# Patient Record
Sex: MALE | Race: WHITE
[De-identification: names, ages, dates, MRNs, and addresses within clinical notes are randomized per-mention and may not be internally consistent; named-entity substitution may affect disease eponyms.]

---

## 2018-02-06 ENCOUNTER — HOSPITAL ENCOUNTER (OUTPATIENT)
Dept: HOSPITAL 63 - CT | Age: 68
Discharge: HOME | End: 2018-02-06
Attending: PHYSICIAN ASSISTANT
Payer: MEDICARE

## 2018-02-06 DIAGNOSIS — Z86.73: ICD-10-CM

## 2018-02-06 DIAGNOSIS — H70.92: ICD-10-CM

## 2018-02-06 DIAGNOSIS — H53.8: Primary | ICD-10-CM

## 2018-02-06 PROCEDURE — 70450 CT HEAD/BRAIN W/O DYE: CPT

## 2018-02-06 NOTE — RAD
CT of the head without contrast, 2/6/2018:



History: Right eye visual disturbance, previous stroke



There is mild bilateral cerebral atrophy.  There are extensive patchy

lucencies in the deep white matter bilaterally compatible with chronic

ischemic change.  The ventricles are mildly prominent on a compensatory basis.

 There is no shift of the midline structures.  There is no evidence of acute

intracranial hemorrhage or mass effect.



There is fluid in some of the left mastoid air cells presumably on an

inflammatory basis.





IMPRESSION:

1.  Cerebral atrophy.

2.  Extensive bilateral deep white matter lucencies compatible with chronic

ischemic change.

3.  No acute intracranial abnormality is detected.

4.  Left mastoiditis















[employer]RS Compliance Statement:



One or more of the following individualized dose reduction techniques were

utilized for this examination:

1. Automated exposure control

2. Adjustment of the mA and/or kV according to patient size

3. Use of iterative reconstruction technique

## 2018-04-16 ENCOUNTER — HOSPITAL ENCOUNTER (EMERGENCY)
Dept: HOSPITAL 61 - ER | Age: 68
Discharge: HOME | End: 2018-04-16
Payer: MEDICARE

## 2018-04-16 DIAGNOSIS — K21.9: ICD-10-CM

## 2018-04-16 DIAGNOSIS — N28.9: ICD-10-CM

## 2018-04-16 DIAGNOSIS — Z79.4: ICD-10-CM

## 2018-04-16 DIAGNOSIS — E11.9: ICD-10-CM

## 2018-04-16 DIAGNOSIS — J44.9: ICD-10-CM

## 2018-04-16 DIAGNOSIS — F12.10: ICD-10-CM

## 2018-04-16 DIAGNOSIS — I50.9: ICD-10-CM

## 2018-04-16 DIAGNOSIS — E87.5: ICD-10-CM

## 2018-04-16 DIAGNOSIS — Z95.5: ICD-10-CM

## 2018-04-16 DIAGNOSIS — I25.2: ICD-10-CM

## 2018-04-16 DIAGNOSIS — E78.00: ICD-10-CM

## 2018-04-16 DIAGNOSIS — R42: Primary | ICD-10-CM

## 2018-04-16 DIAGNOSIS — I11.0: ICD-10-CM

## 2018-04-16 DIAGNOSIS — I25.10: ICD-10-CM

## 2018-04-16 DIAGNOSIS — Z86.73: ICD-10-CM

## 2018-04-16 LAB
ADD MAN DIFF?: NO
ALBUMIN SERPL-MCNC: 3.7 G/DL (ref 3.4–5)
ALBUMIN/GLOB SERPL: 1.1 {RATIO} (ref 1–1.7)
ALP SERPL-CCNC: 36 U/L (ref 46–116)
ALT (SGPT): 34 U/L (ref 16–63)
ANION GAP SERPL CALC-SCNC: 10 MMOL/L (ref 6–14)
AST SERPL-CCNC: 27 U/L (ref 15–37)
BASO #: 0.1 X10^3/UL (ref 0–0.2)
BASO %: 1 % (ref 0–3)
BLOOD UREA NITROGEN: 29 MG/DL (ref 8–26)
BUN/CREAT SERPL: 19 (ref 6–20)
CALCIUM: 9.3 MG/DL (ref 8.5–10.1)
CHLORIDE: 105 MMOL/L (ref 98–107)
CO2 SERPL-SCNC: 24 MMOL/L (ref 21–32)
CREAT SERPL-MCNC: 1.5 MG/DL (ref 0.7–1.3)
EOS #: 0.3 X10^3/UL (ref 0–0.7)
EOS %: 3 % (ref 0–3)
GFR SERPLBLD BASED ON 1.73 SQ M-ARVRAT: 46.8 ML/MIN
GLOBULIN SER-MCNC: 3.3 G/DL (ref 2.2–3.8)
GLUCOSE SERPL-MCNC: 126 MG/DL (ref 70–99)
HCG SERPL-ACNC: 10.5 X10^3/UL (ref 4–11)
HEMATOCRIT: 39.5 % (ref 39–53)
HEMOGLOBIN: 13 G/DL (ref 13–17.5)
INR: 1 (ref 0.8–1.1)
LYMPH #: 2.5 X10^3/UL (ref 1–4.8)
LYMPH %: 24 % (ref 24–48)
MEAN CORPUSCULAR HEMOGLOBIN: 30 PG (ref 25–35)
MEAN CORPUSCULAR HGB CONC: 33 G/DL (ref 31–37)
MEAN CORPUSCULAR VOLUME: 91 FL (ref 79–100)
MONO #: 0.9 X10^3/UL (ref 0–1.1)
MONO %: 9 % (ref 0–9)
NEUT #: 6.7 X10^3UL (ref 1.8–7.7)
NEUT %: 64 % (ref 31–73)
PLATELET COUNT: 259 X10^3/UL (ref 140–400)
POC GLUCOSE: 132 MG/DL (ref 70–99)
POTASSIUM SERPL-SCNC: 5.3 MMOL/L (ref 3.5–5.1)
PROTHROMBIN TIME PATIENT: 12.8 SEC (ref 11.7–14)
RED BLOOD COUNT: 4.32 X10^6/UL (ref 4.3–5.7)
RED CELL DISTRIBUTION WIDTH: 13.5 % (ref 11.5–14.5)
SODIUM: 139 MMOL/L (ref 136–145)
TOTAL BILIRUBIN: 0.3 MG/DL (ref 0.2–1)
TOTAL PROTEIN: 7 G/DL (ref 6.4–8.2)

## 2018-04-16 PROCEDURE — 96360 HYDRATION IV INFUSION INIT: CPT

## 2018-04-16 PROCEDURE — 85610 PROTHROMBIN TIME: CPT

## 2018-04-16 PROCEDURE — 70450 CT HEAD/BRAIN W/O DYE: CPT

## 2018-04-16 PROCEDURE — 36415 COLL VENOUS BLD VENIPUNCTURE: CPT

## 2018-04-16 PROCEDURE — 82962 GLUCOSE BLOOD TEST: CPT

## 2018-04-16 PROCEDURE — 80053 COMPREHEN METABOLIC PANEL: CPT

## 2018-04-16 PROCEDURE — 93005 ELECTROCARDIOGRAM TRACING: CPT

## 2018-04-16 PROCEDURE — 99285 EMERGENCY DEPT VISIT HI MDM: CPT

## 2018-04-16 PROCEDURE — 85025 COMPLETE CBC W/AUTO DIFF WBC: CPT

## 2018-04-16 RX ADMIN — BACITRACIN 1 MLS/HR: 5000 INJECTION, POWDER, FOR SOLUTION INTRAMUSCULAR at 16:00
